# Patient Record
Sex: FEMALE
[De-identification: names, ages, dates, MRNs, and addresses within clinical notes are randomized per-mention and may not be internally consistent; named-entity substitution may affect disease eponyms.]

---

## 2017-02-21 ENCOUNTER — HOSPITAL ENCOUNTER (OUTPATIENT)
Dept: HOSPITAL 53 - M WHC | Age: 52
End: 2017-02-21
Attending: NURSE PRACTITIONER
Payer: COMMERCIAL

## 2017-02-21 DIAGNOSIS — Z12.31: Primary | ICD-10-CM

## 2017-02-21 DIAGNOSIS — Z80.0: ICD-10-CM

## 2017-02-21 DIAGNOSIS — Z98.890: ICD-10-CM

## 2017-02-21 NOTE — REPMRS
Patient History

The patient states she had a clinical breast exam in 01/2017.

Family history of pancreatic cancer in mother at age 63.

Reductions of both breasts, 2004.

 

Digital Woman Screen Mammo: February 21, 2017 - Exam #: 

[id number]

Bilateral CC and MLO view(s) were taken.

 

Technologist: Diane Howell, Technologist

Prior study comparison: January 15, 2016, digital woman screen 

mammo performed at [facility].  September 5, 2014, 

bilateral bilat screen digital mammo, performed at [facility] ([facility]).  February 5, 2013, bilateral bilat screen 

digital mammo, performed at [facility] ([facility]).  

February 3, 2012, bilateral bilat screen digital mammo, performed

at [facility] ([facility]).  January 31, 2011, bilateral 

screening mammogram, performed at [facility] ([facility]).

 

FINDINGS: There are scattered fibroglandular densities.  There 

has been no change in the appearance of the mammogram from the 

prior studies.  There is a mild amount of residual fibroglandular

tissue which is fairly symmetric. There is no interval 

development of dominant mass, architectural distortion, or 

clustered microcalcification suggestive of malignancy. There are 

scattered, small, benign calcifications of doubtful clinical 

significance. There is a stable benign nodular tissue asymmetry 

lower inner quadrant left breast seen back to '10 priors and 

without increase in size.  No significant changes when compared 

with prior studies.

 

ASSESSMENT: BI-RADS/ACR category 2 mammogram. Benign finding(s).

 

Recommendation

Routine screening mammogram in 1 year (for women over age 40).

This mammogram was interpreted with the aid of an FDA-approved 

computer-aided dectection system.

A. Negative x-ray reports should not delay biopsy if a dominant 

or clinically suspicious mass is present.

B. Four to eight percent of cancers are not identified by 

mammography.

C. Adenosis and dense breast may obscure an underlying neoplasm.

 

Electronically Signed By: Codey Heredia MD 02/21/17 [id number]

## 2017-04-19 ENCOUNTER — HOSPITAL ENCOUNTER (OUTPATIENT)
Dept: HOSPITAL 53 - M LAB REF | Age: 52
End: 2017-04-19
Attending: PHYSICIAN ASSISTANT
Payer: COMMERCIAL

## 2017-04-19 DIAGNOSIS — N39.0: Primary | ICD-10-CM

## 2017-05-04 ENCOUNTER — HOSPITAL ENCOUNTER (OUTPATIENT)
Dept: HOSPITAL 53 - M LAB REF | Age: 52
End: 2017-05-04
Attending: NURSE PRACTITIONER
Payer: COMMERCIAL

## 2017-05-04 DIAGNOSIS — Z98.84: Primary | ICD-10-CM

## 2017-05-04 LAB
FERRITIN SERPL-MCNC: 3 NG/ML (ref 8–252)
VIT B12 SERPL-MCNC: 466 PG/ML (ref 247–911)

## 2017-05-08 LAB — VIT A SERPL-MCNC: 56 UG/DL (ref 20–65)

## 2017-08-28 ENCOUNTER — HOSPITAL ENCOUNTER (OUTPATIENT)
Dept: HOSPITAL 53 - M OPP | Age: 52
Discharge: HOME | End: 2017-08-28
Attending: INTERNAL MEDICINE
Payer: COMMERCIAL

## 2017-08-28 VITALS — DIASTOLIC BLOOD PRESSURE: 79 MMHG | SYSTOLIC BLOOD PRESSURE: 129 MMHG

## 2017-08-28 VITALS — WEIGHT: 167 LBS | BODY MASS INDEX: 30.73 KG/M2 | HEIGHT: 62 IN

## 2017-08-28 DIAGNOSIS — Z88.1: ICD-10-CM

## 2017-08-28 DIAGNOSIS — Z88.0: ICD-10-CM

## 2017-08-28 DIAGNOSIS — D12.8: ICD-10-CM

## 2017-08-28 DIAGNOSIS — D64.9: ICD-10-CM

## 2017-08-28 DIAGNOSIS — D12.2: ICD-10-CM

## 2017-08-28 DIAGNOSIS — Z79.899: ICD-10-CM

## 2017-08-28 DIAGNOSIS — Z98.84: ICD-10-CM

## 2017-08-28 DIAGNOSIS — Z12.11: Primary | ICD-10-CM

## 2017-08-28 NOTE — ROOR
________________________________________________________________________________

Patient Name: Chelle Medina             Procedure Date: 8/28/2017 10:15 AM

MRN: [id number]                          Account Number: [id number]

YOB: 1965                Age: 52

Room: [facility]                            Gender: Female

Note Status: Finalized                 

________________________________________________________________________________

 

Procedure:           Colonoscopy

Indications:         Screening for colorectal malignant neoplasm

Providers:           Arthur Márquez MD

Referring MD:        CALI VASQUEZ JR, MD

Requesting Provider: 

Medicines:           Monitored Anesthesia Care

Complications:       No immediate complications.

________________________________________________________________________________

Procedure:           Pre-Anesthesia Assessment:

                     - Prior to the procedure, a History and Physical was 

                     performed, and patient medications and allergies were 

                     reviewed. The patient is competent. The risks and 

                     benefits of the procedure and the sedation options and 

                     risks were discussed with the patient. All questions were 

                     answered and informed consent was obtained. Patient 

                     identification and proposed procedure were verified by 

                     the physician, the nurse and the anesthetist in the 

                     procedure room. Mental Status Examination: alert and 

                     oriented. Airway Examination: normal oropharyngeal airway 

                     and neck mobility. Respiratory Examination: clear to 

                     auscultation. CV Examination: normal. Prophylactic 

                     Antibiotics: The patient does not require prophylactic 

                     antibiotics. Prior Anticoagulants: The patient has taken 

                     no previous anticoagulant or antiplatelet agents. ASA 

                     Grade Assessment: I - A normal, healthy patient. After 

                     reviewing the risks and benefits, the patient was deemed 

                     in satisfactory condition to undergo the procedure. The 

                     anesthesia plan was to use monitored anesthesia care 

                     (MAC). Immediately prior to administration of 

                     medications, the patient was re-assessed for adequacy to 

                     receive sedatives. The heart rate, respiratory rate, 

                     oxygen saturations, blood pressure, adequacy of pulmonary 

                     ventilation, and response to care were monitored 

                     throughout the procedure. The physical status of the 

                     patient was re-assessed after the procedure.

                     The Colonoscope was introduced through the anus and 

                     advanced to the cecum, identified by appendiceal orifice 

                     and ileocecal valve. The colonoscopy was performed 

                     without difficulty. The patient tolerated the procedure 

                     well. The quality of the bowel preparation was good. The 

                     ileocecal valve, appendiceal orifice, and rectum were 

                     photographed. Scope insertion time was 3 minutes. Scope 

                     withdrawal time was 20 minutes. The total duration of the 

                     procedure was 25 minutes.

                                                                                

Findings:

     The perianal and digital rectal examinations were normal.

     A 40 mm polyp was found in the ascending colon. The polyp was 

     semi-pedunculated. Area was successfully injected with 3 mL of a 1:10,000 

     solution of epinephrine for drug delivery. The polyp was removed with a 

     hot snare. Resection and retrieval were complete. Verification of patient 

     identification for the specimen was done by the physician and nurse using 

     the patient's name, birth date and medical record number. Estimated blood 

     loss was minimal. To close a defect after polypectomy, three hemostatic 

     clips were successfully placed. There was no bleeding at the end of the 

     procedure.

     A 10 mm polyp was found in the rectum. The polyp was sessile. The polyp 

     was removed with a hot snare. Resection and retrieval were complete.

     The exam was otherwise without abnormality.

                                                                                

Impression:          - One 40 mm polyp in the ascending colon, removed with a 

                     hot snare. Resected and retrieved. Injected. Clips were 

                     placed.

                     - One 10 mm polyp in the rectum, removed with a hot 

                     snare. Resected and retrieved.

                     - The examination was otherwise normal.

Recommendation:      - Patient has a contact number available for emergencies. 

                     The signs and symptoms of potential delayed complications 

                     were discussed with the patient. Return to normal 

                     activities tomorrow. Written discharge instructions were 

                     provided to the patient.

                     - Resume previous diet.

                     - Continue present medications.

                     - Await pathology results.

                     - Repeat colonoscopy in 6 months for surveillance based 

                     on pathology results.

                     - Return to GI clinic as previously scheduled on 

                     9/11/2017 at 11:15 AM

                     - Return to primary care physician.

                                                                                

 

Arthur Márquez MD

_______________________

Arthur Márquez MD

8/28/2017 11:24:08 AM

This report has been signed electronically.

Number of Addenda: 0

 

Note Initiated On: 8/28/2017 10:15 AM

Estimated Blood Loss:

     Estimated blood loss was minimal.

## 2018-01-26 ENCOUNTER — HOSPITAL ENCOUNTER (OUTPATIENT)
Dept: HOSPITAL 53 - M LAB REF | Age: 53
End: 2018-01-26
Attending: NURSE PRACTITIONER
Payer: COMMERCIAL

## 2018-01-26 DIAGNOSIS — D50.9: Primary | ICD-10-CM

## 2018-01-26 LAB
FERRITIN: 9 NG/ML (ref 8–252)
IRON (FE): 66 UG/DL (ref 50–170)
IRON SATN MFR SERPL: 15.1 % (ref 13.2–45)
TOTAL IRON BINDING CAPACITY: 437 UG/DL (ref 250–450)

## 2018-01-26 PROCEDURE — 83550 IRON BINDING TEST: CPT

## 2018-03-30 ENCOUNTER — HOSPITAL ENCOUNTER (OUTPATIENT)
Dept: HOSPITAL 53 - M OPP | Age: 53
Discharge: HOME | End: 2018-03-30
Attending: INTERNAL MEDICINE
Payer: COMMERCIAL

## 2018-03-30 DIAGNOSIS — K64.8: ICD-10-CM

## 2018-03-30 DIAGNOSIS — D64.9: ICD-10-CM

## 2018-03-30 DIAGNOSIS — Z86.010: ICD-10-CM

## 2018-03-30 DIAGNOSIS — Z79.899: ICD-10-CM

## 2018-03-30 DIAGNOSIS — D12.5: ICD-10-CM

## 2018-03-30 DIAGNOSIS — M54.89: ICD-10-CM

## 2018-03-30 DIAGNOSIS — Z12.11: Primary | ICD-10-CM

## 2018-03-30 DIAGNOSIS — R12: ICD-10-CM

## 2018-03-30 DIAGNOSIS — Z98.84: ICD-10-CM

## 2018-03-30 DIAGNOSIS — K21.9: ICD-10-CM

## 2018-03-30 DIAGNOSIS — Z88.1: ICD-10-CM

## 2018-03-30 DIAGNOSIS — Z88.0: ICD-10-CM

## 2018-03-30 DIAGNOSIS — D12.0: ICD-10-CM

## 2018-03-30 DIAGNOSIS — K57.30: ICD-10-CM

## 2018-03-30 PROCEDURE — 45385 COLONOSCOPY W/LESION REMOVAL: CPT

## 2018-03-30 RX ADMIN — SODIUM CHLORIDE 1 MLS/HR: 9 INJECTION, SOLUTION INTRAVENOUS at 09:04

## 2018-08-02 ENCOUNTER — HOSPITAL ENCOUNTER (OUTPATIENT)
Dept: HOSPITAL 53 - M LAB REF | Age: 53
End: 2018-08-02
Attending: NURSE PRACTITIONER
Payer: COMMERCIAL

## 2018-08-02 DIAGNOSIS — Z98.84: Primary | ICD-10-CM

## 2018-08-02 LAB — VITAMIN B12 LEVEL: 621 PG/ML (ref 247–911)

## 2018-08-02 PROCEDURE — 82607 VITAMIN B-12: CPT

## 2018-08-06 LAB
VIT A SERPL-MCNC: 41.8 UG/DL (ref 33.1–100)
VITAMIN B1 LEVEL WHOLE BLOOD: 126.9 NMOL/L (ref 66.5–200)

## 2019-12-20 ENCOUNTER — HOSPITAL ENCOUNTER (OUTPATIENT)
Dept: HOSPITAL 53 - M LAB REF | Age: 54
End: 2019-12-20
Attending: NURSE PRACTITIONER
Payer: COMMERCIAL

## 2019-12-20 DIAGNOSIS — I48.0: ICD-10-CM

## 2019-12-20 DIAGNOSIS — Z98.84: Primary | ICD-10-CM

## 2020-12-22 ENCOUNTER — HOSPITAL ENCOUNTER (OUTPATIENT)
Dept: HOSPITAL 53 - M LAB REF | Age: 55
End: 2020-12-22
Attending: NURSE PRACTITIONER
Payer: COMMERCIAL

## 2020-12-22 DIAGNOSIS — M25.50: ICD-10-CM

## 2020-12-22 DIAGNOSIS — Z98.84: ICD-10-CM

## 2020-12-22 DIAGNOSIS — D50.9: Primary | ICD-10-CM

## 2020-12-22 LAB
FERRITIN SERPL-MCNC: 6 NG/ML (ref 8–252)
IRON SERPL-MCNC: 66 UG/DL (ref 50–170)

## 2020-12-24 LAB — CCP IGG SERPL-ACNC: 26 UNITS (ref 0–19)

## 2021-04-05 ENCOUNTER — HOSPITAL ENCOUNTER (OUTPATIENT)
Dept: HOSPITAL 53 - M LABSMTC | Age: 56
End: 2021-04-05
Attending: ANESTHESIOLOGY
Payer: COMMERCIAL

## 2021-04-05 DIAGNOSIS — Z01.812: Primary | ICD-10-CM

## 2021-04-05 DIAGNOSIS — Z20.822: ICD-10-CM

## 2021-04-09 ENCOUNTER — HOSPITAL ENCOUNTER (OUTPATIENT)
Dept: HOSPITAL 53 - M OPP | Age: 56
Discharge: HOME | End: 2021-04-09
Attending: INTERNAL MEDICINE
Payer: COMMERCIAL

## 2021-04-09 VITALS — WEIGHT: 185.8 LBS | BODY MASS INDEX: 32.92 KG/M2 | HEIGHT: 63 IN

## 2021-04-09 VITALS — DIASTOLIC BLOOD PRESSURE: 68 MMHG | SYSTOLIC BLOOD PRESSURE: 113 MMHG

## 2021-04-09 DIAGNOSIS — Z88.1: ICD-10-CM

## 2021-04-09 DIAGNOSIS — K63.5: ICD-10-CM

## 2021-04-09 DIAGNOSIS — Z79.899: ICD-10-CM

## 2021-04-09 DIAGNOSIS — Z88.0: ICD-10-CM

## 2021-04-09 DIAGNOSIS — Z12.11: Primary | ICD-10-CM

## 2021-04-09 DIAGNOSIS — K64.8: ICD-10-CM

## 2021-04-09 DIAGNOSIS — Z86.010: ICD-10-CM

## 2021-04-09 NOTE — ROOR
________________________________________________________________________________

Patient Name: Chelle Medina             Procedure Date: 4/9/2021 8:00 AM

MRN: [id number]                          Account Number: [id number]

YOB: 1965                Age: 55

Room: [facility]                            Gender: Female

Note Status: Finalized                 

________________________________________________________________________________

 

Procedure:            Colonoscopy

Indications:          Surveillance: Personal history of adenomatous polyps on 

                      last colonoscopy 3 years ago

Providers:            Arthur Márquez MD

Referring MD:         CALI VASQUEZ JR, MD

Requesting Provider:  

Medicines:            Monitored Anesthesia Care

Complications:        No immediate complications.

________________________________________________________________________________

Procedure:            Pre-Anesthesia Assessment:

                      - Prior to the procedure, a History and Physical was 

                      performed, and patient medications and allergies were 

                      reviewed. The patient is competent. The risks and 

                      benefits of the procedure and the sedation options and 

                      risks were discussed with the patient. All questions 

                      were answered and informed consent was obtained. Patient 

                      identification and proposed procedure were verified by 

                      the physician, the nurse and the anesthesiologist in the 

                      procedure room. Mental Status Examination: alert and 

                      oriented. Airway Examination: normal oropharyngeal 

                      airway and neck mobility. Respiratory Examination: clear 

                      to auscultation. CV Examination: normal. Prophylactic 

                      Antibiotics: The patient does not require prophylactic 

                      antibiotics. Prior Anticoagulants: The patient has taken 

                      no previous anticoagulant or antiplatelet agents. ASA 

                      Grade Assessment: II - A patient with mild systemic 

                      disease. After reviewing the risks and benefits, the 

                      patient was deemed in satisfactory condition to undergo 

                      the procedure. The anesthesia plan was to use monitored 

                      anesthesia care (MAC). Immediately prior to 

                      administration of medications, the patient was 

                      re-assessed for adequacy to receive sedatives. The heart 

                      rate, respiratory rate, oxygen saturations, blood 

                      pressure, adequacy of pulmonary ventilation, and 

                      response to care were monitored throughout the 

                      procedure. The physical status of the patient was 

                      re-assessed after the procedure.

                      The Colonoscope was introduced through the anus and 

                      advanced to the terminal ileum, with identification of 

                      the appendiceal orifice and IC valve. The colonoscopy 

                      was performed without difficulty. The patient tolerated 

                      the procedure well. The quality of the bowel preparation 

                      was good. The ileocecal valve, appendiceal orifice, and 

                      rectum were photographed. Scope insertion time was 2 

                      minutes. Scope withdrawal time was 9 minutes. The total 

                      duration of the procedure was 11 minutes.

                                                                                

Findings:

     The perianal and digital rectal examinations were normal.

     The terminal ileum appeared normal.

     Three sessile polyps were found in the sigmoid colon and ascending colon. 

     The polyps were 2 to 4 mm in size. These polyps were removed with a jumbo 

     cold forceps. Resection and retrieval were complete. Verification of 

     patient identification for the specimen was done by the physician and 

     nurse using the patient's name, birth date and medical record number. 

     Estimated blood loss was minimal.

     Non-bleeding external and internal hemorrhoids were found during 

     retroflexion. The hemorrhoids were medium-sized.

                                                                                

Impression:           - The examined portion of the ileum was normal.

                      - Three 2 to 4 mm polyps in the sigmoid colon and in the 

                      ascending colon, removed with a jumbo cold forceps. 

                      Resected and retrieved.

                      - Non-bleeding external and internal hemorrhoids.

Recommendation:       - Patient has a contact number available for 

                      emergencies. The signs and symptoms of potential delayed 

                      complications were discussed with the patient. Return to 

                      normal activities tomorrow. Written discharge 

                      instructions were provided to the patient.

                      - High fiber diet.

                      - Continue present medications.

                      - Use fiber, for example Citrucel, Fibercon, Konsyl or 

                      Metamucil.

                      - Await pathology results.

                      - Repeat colonoscopy in 3 - 5 years for surveillance 

                      based on pathology results and due to personal history 

                      of colon polyps in past Colonoscopy.

                      - Telephone GI clinic for pathology results in 2 weeks.

                      - Return to primary care physician.

                                                                                

Procedure Code(s):    --- Professional ---

                      79283, Colonoscopy, flexible; with biopsy, single or 

                      multiple

Diagnosis Code(s):    --- Professional ---

                      Z86.010, Personal history of colonic polyps

                      K64.8, Other hemorrhoids

                      K63.5, Polyp of colon

 

CPT copyright 2019 [employer]. All rights reserved.

 

The codes documented in this report are preliminary and upon  review may 

be revised to meet current compliance requirements.

 

Arthur Márquez MD

_______________________

Arthur Márquez MD

4/9/2021 8:28:24 AM

Electronically signed by Arthur Márquez MD

Number of Addenda: 0

 

Note Initiated On: 4/9/2021 8:00 AM

Estimated Blood Loss: Estimated blood loss was minimal.

## 2021-12-23 ENCOUNTER — HOSPITAL ENCOUNTER (OUTPATIENT)
Dept: HOSPITAL 53 - M LAB REF | Age: 56
End: 2021-12-23
Attending: NURSE PRACTITIONER
Payer: COMMERCIAL

## 2021-12-23 DIAGNOSIS — D50.9: Primary | ICD-10-CM

## 2021-12-23 DIAGNOSIS — Z98.84: ICD-10-CM

## 2021-12-23 LAB
FERRITIN SERPL-MCNC: 8 NG/ML (ref 8–252)
IRON SATN MFR SERPL: 11.7 % (ref 13.2–45)
IRON SERPL-MCNC: 51 UG/DL (ref 50–170)
TIBC SERPL-MCNC: 435 UG/DL (ref 250–450)
VIT B12 SERPL-MCNC: > 2000 PG/ML (ref 247–911)

## 2022-03-09 ENCOUNTER — HOSPITAL ENCOUNTER (OUTPATIENT)
Dept: HOSPITAL 53 - M WHC | Age: 57
End: 2022-03-09
Attending: UROLOGY
Payer: COMMERCIAL

## 2022-03-09 ENCOUNTER — HOSPITAL ENCOUNTER (OUTPATIENT)
Dept: HOSPITAL 53 - M WHC | Age: 57
End: 2022-03-09
Attending: NURSE PRACTITIONER
Payer: COMMERCIAL

## 2022-03-09 DIAGNOSIS — N92.1: Primary | ICD-10-CM

## 2022-03-09 DIAGNOSIS — Z12.31: Primary | ICD-10-CM

## 2022-04-23 ENCOUNTER — HOSPITAL ENCOUNTER (OUTPATIENT)
Dept: HOSPITAL 53 - M LABSMTC | Age: 57
End: 2022-04-23
Attending: ANESTHESIOLOGY
Payer: COMMERCIAL

## 2022-04-23 DIAGNOSIS — Z20.822: ICD-10-CM

## 2022-04-23 DIAGNOSIS — Z01.812: Primary | ICD-10-CM

## 2022-06-22 ENCOUNTER — HOSPITAL ENCOUNTER (OUTPATIENT)
Dept: HOSPITAL 53 - M LABSMTC | Age: 57
End: 2022-06-22
Attending: ANESTHESIOLOGY
Payer: COMMERCIAL

## 2022-06-22 DIAGNOSIS — Z20.822: ICD-10-CM

## 2022-06-22 DIAGNOSIS — Z01.818: Primary | ICD-10-CM

## 2022-06-23 ENCOUNTER — HOSPITAL ENCOUNTER (OUTPATIENT)
Dept: HOSPITAL 53 - M SDC | Age: 57
Discharge: HOME | End: 2022-06-23
Attending: UROLOGY
Payer: COMMERCIAL

## 2022-06-23 VITALS — HEIGHT: 63 IN | BODY MASS INDEX: 36.32 KG/M2 | WEIGHT: 205 LBS

## 2022-06-23 VITALS — DIASTOLIC BLOOD PRESSURE: 79 MMHG | SYSTOLIC BLOOD PRESSURE: 140 MMHG

## 2022-06-23 DIAGNOSIS — R10.2: ICD-10-CM

## 2022-06-23 DIAGNOSIS — M54.50: ICD-10-CM

## 2022-06-23 DIAGNOSIS — N93.9: Primary | ICD-10-CM

## 2022-06-23 DIAGNOSIS — G25.81: ICD-10-CM

## 2022-06-23 DIAGNOSIS — Z88.0: ICD-10-CM

## 2022-06-23 DIAGNOSIS — Z79.899: ICD-10-CM

## 2022-06-23 PROCEDURE — 81025 URINE PREGNANCY TEST: CPT

## 2022-06-23 PROCEDURE — 88305 TISSUE EXAM BY PATHOLOGIST: CPT

## 2022-06-23 PROCEDURE — 58563 HYSTEROSCOPY ABLATION: CPT

## 2022-11-29 ENCOUNTER — HOSPITAL ENCOUNTER (OUTPATIENT)
Dept: HOSPITAL 53 - M LAB REF | Age: 57
End: 2022-11-29
Attending: NURSE PRACTITIONER
Payer: COMMERCIAL

## 2022-11-29 DIAGNOSIS — M54.50: Primary | ICD-10-CM

## 2023-03-13 ENCOUNTER — HOSPITAL ENCOUNTER (OUTPATIENT)
Dept: HOSPITAL 53 - M WHC | Age: 58
End: 2023-03-13
Attending: NURSE PRACTITIONER
Payer: COMMERCIAL

## 2023-03-13 DIAGNOSIS — Z12.31: Primary | ICD-10-CM

## 2023-08-25 ENCOUNTER — HOSPITAL ENCOUNTER (OUTPATIENT)
Dept: HOSPITAL 53 - M LAB REF | Age: 58
End: 2023-08-25
Attending: NURSE PRACTITIONER
Payer: COMMERCIAL

## 2023-08-25 DIAGNOSIS — Z98.84: ICD-10-CM

## 2023-08-25 DIAGNOSIS — D72.819: ICD-10-CM

## 2023-08-25 DIAGNOSIS — D50.9: Primary | ICD-10-CM

## 2023-08-25 LAB
FERRITIN SERPL-MCNC: 5.3 NG/ML (ref 7.3–270.7)
IRON SATN MFR SERPL: 13.7 % (ref 13.2–45)
IRON SERPL-MCNC: 56 UG/DL (ref 50–170)
TIBC SERPL-MCNC: 408 UG/DL (ref 250–425)
VIT B12 SERPL-MCNC: 1112 PG/ML (ref 211–911)

## 2024-03-14 ENCOUNTER — HOSPITAL ENCOUNTER (OUTPATIENT)
Dept: HOSPITAL 53 - M WHC | Age: 59
End: 2024-03-14
Attending: NURSE PRACTITIONER
Payer: COMMERCIAL

## 2024-03-14 DIAGNOSIS — Z12.31: Primary | ICD-10-CM

## 2024-03-14 DIAGNOSIS — Z13.820: ICD-10-CM

## 2024-08-06 ENCOUNTER — HOSPITAL ENCOUNTER (OUTPATIENT)
Dept: HOSPITAL 53 - M WUC | Age: 59
End: 2024-08-06
Attending: NURSE PRACTITIONER
Payer: COMMERCIAL

## 2024-08-06 DIAGNOSIS — Z98.84: ICD-10-CM

## 2024-08-06 DIAGNOSIS — M79.642: Primary | ICD-10-CM

## 2024-08-06 LAB
FERRITIN SERPL-MCNC: 7.4 NG/ML (ref 7.3–270.7)
FOLATE SERPL-MCNC: > 24 NG/ML (ref 5.4–?)
IRON SATN MFR SERPL: 23.2 % (ref 13.2–45)
IRON SERPL-MCNC: 89 UG/DL (ref 50–170)
PHOSPHATE SERPL-MCNC: 3.5 MG/DL (ref 2.5–4.9)
TIBC SERPL-MCNC: 383 UG/DL (ref 250–425)
VIT B12 SERPL-MCNC: 789 PG/ML (ref 211–911)

## 2024-08-12 ENCOUNTER — HOSPITAL ENCOUNTER (OUTPATIENT)
Dept: HOSPITAL 53 - M LAB REF | Age: 59
End: 2024-08-12
Attending: NURSE PRACTITIONER
Payer: COMMERCIAL

## 2024-08-12 DIAGNOSIS — M13.80: Primary | ICD-10-CM

## 2024-08-12 LAB
CRP SERPL-MCNC: < 0.4 MG/DL (ref ?–1)
HBV CORE IGM SER QL: NEGATIVE
HBV SURFACE AB SER-ACNC: NEGATIVE M[IU]/ML
HCV AB SER QL: 0.02 INDEX (ref ?–0.8)
RHEUMATOID FACT SERPL-ACNC: 5.5 IU/ML (ref ?–14)

## 2024-08-14 LAB
ANA SER QL IF: NEGATIVE
CCP IGG SERPL-ACNC: < 16 UNITS (ref ?–20)